# Patient Record
Sex: FEMALE | Race: OTHER | HISPANIC OR LATINO | URBAN - METROPOLITAN AREA
[De-identification: names, ages, dates, MRNs, and addresses within clinical notes are randomized per-mention and may not be internally consistent; named-entity substitution may affect disease eponyms.]

---

## 2017-01-06 ENCOUNTER — OUTPATIENT (OUTPATIENT)
Dept: OUTPATIENT SERVICES | Facility: HOSPITAL | Age: 37
LOS: 1 days | End: 2017-01-06
Payer: MEDICAID

## 2017-01-06 VITALS
WEIGHT: 130.07 LBS | RESPIRATION RATE: 18 BRPM | HEIGHT: 60 IN | OXYGEN SATURATION: 100 % | DIASTOLIC BLOOD PRESSURE: 91 MMHG | TEMPERATURE: 98 F | SYSTOLIC BLOOD PRESSURE: 144 MMHG | HEART RATE: 98 BPM

## 2017-01-06 DIAGNOSIS — M24.475 RECURRENT DISLOCATION, LEFT FOOT: ICD-10-CM

## 2017-01-06 DIAGNOSIS — Z98.890 OTHER SPECIFIED POSTPROCEDURAL STATES: Chronic | ICD-10-CM

## 2017-01-06 DIAGNOSIS — M25.572 PAIN IN LEFT ANKLE AND JOINTS OF LEFT FOOT: ICD-10-CM

## 2017-01-06 DIAGNOSIS — Z01.818 ENCOUNTER FOR OTHER PREPROCEDURAL EXAMINATION: ICD-10-CM

## 2017-01-06 DIAGNOSIS — N90.9 NONINFLAMMATORY DISORDER OF VULVA AND PERINEUM, UNSPECIFIED: Chronic | ICD-10-CM

## 2017-01-06 LAB — HCG UR QL: NEGATIVE — SIGNIFICANT CHANGE UP

## 2017-01-06 PROCEDURE — 81025 URINE PREGNANCY TEST: CPT

## 2017-01-06 PROCEDURE — G0463: CPT

## 2017-01-06 NOTE — H&P PST ADULT - FAMILY HISTORY
Father  Still living? Yes, Estimated age: Age Unknown  Family history of hypertension in father, Age at diagnosis: Age Unknown  Family history of hyperlipidemia, Age at diagnosis: Age Unknown     Grandparent  Still living? No  Family history of diabetes mellitus (DM), Age at diagnosis: Age Unknown  Family history of cervical cancer, Age at diagnosis: Age Unknown     Mother  Still living? Yes, Estimated age: Age Unknown  Family history of depression, Age at diagnosis: Age Unknown  Family history of bronchitis, Age at diagnosis: Age Unknown

## 2017-01-06 NOTE — H&P PST ADULT - HISTORY OF PRESENT ILLNESS
36year old female with pmhx bipolar disorder, anxiety presents with c/o left foot pain x years. Patient is here today for presurgical testing for talotarsal stabilization of left foot on 1/9/17

## 2017-01-06 NOTE — H&P PST ADULT - PSH
History of foot surgery  right foot talotarsal stabilization 10/2016  Vulvar mass  partial vulvectomy

## 2017-01-06 NOTE — H&P PST ADULT - NSANTHOSAYNRD_GEN_A_CORE
No. MIKE screening performed.  STOP BANG Legend: 0-2 = LOW Risk; 3-4 = INTERMEDIATE Risk; 5-8 = HIGH Risk

## 2017-01-30 ENCOUNTER — OUTPATIENT (OUTPATIENT)
Dept: OUTPATIENT SERVICES | Facility: HOSPITAL | Age: 37
LOS: 1 days | Discharge: HOME | End: 2017-01-30
Payer: MEDICAID

## 2017-01-30 VITALS
OXYGEN SATURATION: 100 % | DIASTOLIC BLOOD PRESSURE: 51 MMHG | RESPIRATION RATE: 16 BRPM | HEART RATE: 72 BPM | SYSTOLIC BLOOD PRESSURE: 102 MMHG | TEMPERATURE: 98 F

## 2017-01-30 VITALS
WEIGHT: 130.07 LBS | OXYGEN SATURATION: 99 % | HEART RATE: 85 BPM | TEMPERATURE: 98 F | DIASTOLIC BLOOD PRESSURE: 57 MMHG | SYSTOLIC BLOOD PRESSURE: 118 MMHG | HEIGHT: 60 IN | RESPIRATION RATE: 14 BRPM

## 2017-01-30 DIAGNOSIS — N90.9 NONINFLAMMATORY DISORDER OF VULVA AND PERINEUM, UNSPECIFIED: Chronic | ICD-10-CM

## 2017-01-30 DIAGNOSIS — Z98.890 OTHER SPECIFIED POSTPROCEDURAL STATES: Chronic | ICD-10-CM

## 2017-01-30 DIAGNOSIS — Z01.818 ENCOUNTER FOR OTHER PREPROCEDURAL EXAMINATION: ICD-10-CM

## 2017-01-30 DIAGNOSIS — M25.572 PAIN IN LEFT ANKLE AND JOINTS OF LEFT FOOT: ICD-10-CM

## 2017-01-30 DIAGNOSIS — M24.475 RECURRENT DISLOCATION, LEFT FOOT: ICD-10-CM

## 2017-01-30 LAB — HCG UR QL: NEGATIVE — SIGNIFICANT CHANGE UP

## 2017-01-30 PROCEDURE — 73630 X-RAY EXAM OF FOOT: CPT

## 2017-01-30 PROCEDURE — 73630 X-RAY EXAM OF FOOT: CPT | Mod: 26,LT

## 2017-01-30 PROCEDURE — 97161 PT EVAL LOW COMPLEX 20 MIN: CPT

## 2017-01-30 PROCEDURE — 28740 FUSION OF FOOT BONES: CPT

## 2017-01-30 PROCEDURE — 81025 URINE PREGNANCY TEST: CPT

## 2017-01-30 PROCEDURE — C1776: CPT

## 2017-01-30 RX ORDER — BUPROPION HYDROCHLORIDE 150 MG/1
1 TABLET, EXTENDED RELEASE ORAL
Qty: 0 | Refills: 0 | COMMUNITY

## 2017-01-30 RX ORDER — SODIUM CHLORIDE 9 MG/ML
3 INJECTION INTRAMUSCULAR; INTRAVENOUS; SUBCUTANEOUS EVERY 8 HOURS
Qty: 0 | Refills: 0 | Status: DISCONTINUED | OUTPATIENT
Start: 2017-01-30 | End: 2017-01-30

## 2017-01-30 RX ORDER — SODIUM CHLORIDE 9 MG/ML
1000 INJECTION, SOLUTION INTRAVENOUS
Qty: 0 | Refills: 0 | Status: DISCONTINUED | OUTPATIENT
Start: 2017-01-30 | End: 2017-01-30

## 2017-01-30 RX ORDER — ONDANSETRON 8 MG/1
4 TABLET, FILM COATED ORAL ONCE
Qty: 0 | Refills: 0 | Status: DISCONTINUED | OUTPATIENT
Start: 2017-01-30 | End: 2017-01-30

## 2017-01-30 RX ORDER — FENTANYL CITRATE 50 UG/ML
25 INJECTION INTRAVENOUS
Qty: 0 | Refills: 0 | Status: DISCONTINUED | OUTPATIENT
Start: 2017-01-30 | End: 2017-01-30

## 2017-01-30 RX ORDER — OXYCODONE HYDROCHLORIDE 5 MG/1
1 TABLET ORAL
Qty: 0 | Refills: 0 | COMMUNITY
Start: 2017-01-30

## 2017-01-30 RX ADMIN — SODIUM CHLORIDE 3 MILLILITER(S): 9 INJECTION INTRAMUSCULAR; INTRAVENOUS; SUBCUTANEOUS at 10:24

## 2017-01-30 NOTE — ASU DISCHARGE PLAN (ADULT/PEDIATRIC). - NOTIFY
Swelling that continues/Numbness, color, or temperature change to extremity/Numbness, tingling/Persistent Nausea and Vomiting/Pain not relieved by Medications/Bleeding that does not stop

## 2017-02-02 DIAGNOSIS — M24.475 RECURRENT DISLOCATION, LEFT FOOT: ICD-10-CM

## 2017-02-02 DIAGNOSIS — M25.572 PAIN IN LEFT ANKLE AND JOINTS OF LEFT FOOT: ICD-10-CM
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
## 2017-02-02 DIAGNOSIS — S93.332A: ICD-10-CM

## 2018-04-16 NOTE — ASU DISCHARGE PLAN (ADULT/PEDIATRIC). - MEDICATION SUMMARY - MEDICATIONS TO CHANGE
family member
I will SWITCH the dose or number of times a day I take the medications listed below when I get home from the hospital:  None

## 2018-08-07 NOTE — ASU PATIENT PROFILE, ADULT - NS SC CAGE ALCOHOL ANNOYED YOU
Cardiac Catheterization Appropriate Use    [facility] Clinical Frailty Scale     [] 1. Very Fit  [x] 2. Well  [] 3. Managing Well  [] 4. Vulnerable  [] 5. Mildly Frail  [] 6. Moderately Frail [] 7. Severely Frail  [] 8. Very Severely Frail  [] 9. Terminally III        Heart Failure  []  Yes or [x]  No  If Yes, NYHA Class I []  II []  III []  IV []    If Yes,  Newly Diagnosed? []  Yes or []  No     If Yes, Heart Failure Type? []  Diastolic  []  Systolic  [] Unknown         Electrocardiac   Assessment  Method    [] ECG   [] Telemetry Monitor [] Holter Monitor  [] Other   [] None     If any methods. Results   []Normal  [] Abnormal [] Uninterpretable         If Abnormal, New Antiarrhythmic  Therapy Initiated Prior to Cath Lab   []Yes  []No    If Abnormal,Electrocardiographic   Abnormality Type (select all that apply)      [] VFib   [] Sustained VT  [] Non-sustained VT  [] Exercised Induced VT  [] T wave Inversions    [] ST deviation >=0.5mm [] New LBBB   [] New Onset Afib  [] New Onset Aflutter  [] PVC- frequent  [] PVC- infrequent   [] 2nd Degree AV Block Type 1  [] 2nd Degree AV Block Type 2  [] 3rd Degree AV Block  [] Symptomatic Bradyarrhythmia  [] Other Electrocardiograph Abnormalities         If new onset Afib,     Heart Rate  ______bpm    If Non-sustained VT      (select all that apply) [] Symptomatic [] Newly Diagnosed  [] Other        Stress Test Peformed  [x]  Yes  []  No         If yes, specify test performed and must include risk of ischemia    StressTest Type Test Result Risk of Ischemia   [] Standard Exercise ST             (without imaging)  [x] Stress Echo  [] ST Nuclear   [] ST with CMR    [x] Positive                   []Negative  [] Indeterminate  [] Unavailable [x] High   [] Intermediate  [] Low  [] Unavailable        [] Cardiac CTA (only pick one)     Indication(s) for Cath Lab Visit  [] 3VD   [] 2VD   [] 1VD   [] No Disease       (select all that apply)   [] Indeterminant      [] ACS<=24 hours    []  ACS>24 hours  []New onset angina<=2 months  [x] Worsening Angina  [] Resuscitated Cardiac Arrest   [] Stable Known CAD  [] Suspected CAD  [] Valvular Disease  [] Pericardial Disease  [] Pre-Operative Evaluation  []  Syncope []Post-Cardiac Transplant  []Cardiac Arrhythmias   [] Cardiomyopathy  [] LV dysfunction  [] Evaluation for Exercise Clearance  [] Other         Chest Pain Symptoms     [x] Typical Angina      []   Atypical                                Angina      [] Non-anginal Chest Pain []Asymptomatic      Cardiovascular Instability  [] Yes  [x]  No     If yes, specify instability type (select all that apply)    [] Persistent Ischemic                     Symptoms(chest pain)   [] Hemodynamic Instability(not        Cardiogenic shock)  [] Cardiogenic Shock  [] Refractory Cardiogenic Shock   [] Acute Heart Failure Symptoms  [] Ventricular Arrhythmia        Evaluation for Surgery []  Cardiac Surgery        []  Non-Cardiac Surgery      Functional Capacity []<4 METS  []>= 4 METS without symptoms  [x]  >=4 mets with symptoms    []  Unknown     Surgical Risk [x]  Low     []  Intermediate    []High Risk Vascular  []  High Risk Non-Vascular     Solid Organ Transplant Surgery   []  No  []  Yes                  If yes, Donor   []  No  []  Yes                   If yes, Organ  []  Heart  []  Kidney    []  Liver  []  Lung   []  Pancreas  []  Other Organ         no

## 2019-10-23 ENCOUNTER — TRANSCRIPTION ENCOUNTER (OUTPATIENT)
Age: 39
End: 2019-10-23

## 2019-12-18 NOTE — PHYSICAL THERAPY INITIAL EVALUATION ADULT - SKIN WDL, MLM
"Past Medical History:   Diagnosis Date    Arthritis     Corns and callosities     Deep vein thrombosis     GERD (gastroesophageal reflux disease)     Gout     H/O bladder problems     uses pessary    Hyperlipidemia     Hypertension     Pulmonary embolism        Past Surgical History:   Procedure Laterality Date    APPENDECTOMY  AGE 16    BLADDER SURGERY      Twice    BREAST CYST EXCISION      Benign per patient    EYE SURGERY      LASER FOR GLAUCOMA    HAND SURGERY Left     HYSTERECTOMY      Not related to cancer per patient.    OOPHORECTOMY      TONSILLECTOMY         Review of patient's allergies indicates:   Allergen Reactions    Sulfa dyne Other (See Comments)     Blisters in mouth    Gabapentin     Hydrocodone-acetaminophen      Pt states she doesn t know how this got on there" no     Nitrofurantoin Other (See Comments)     Deathly ill , headaches, weakness, 'wiped out"    Sulfa (sulfonamide antibiotics)        No current facility-administered medications on file prior to encounter.      Current Outpatient Medications on File Prior to Encounter   Medication Sig    atorvastatin (LIPITOR) 10 MG tablet Take 1 tablet (10 mg total) by mouth once daily.    ibuprofen (ADVIL,MOTRIN) 600 MG tablet ibuprofen 600 mg tablet   Take 1 tablet 3 times a day by oral route.    sertraline (ZOLOFT) 100 MG tablet TAKE 1.5 TABLETS (150 MG TOTAL) BY MOUTH ONCE DAILY.    sumatriptan (IMITREX) 100 MG tablet 1 tab po at start of headache, may repeat in 2 hours X1 in 24 hours. (Patient taking differently: as needed. 1 tab po at start of headache, may repeat in 2 hours X1 in 24 hours.)    triamterene-hydrochlorothiazide 37.5-25 mg (MAXZIDE-25) 37.5-25 mg per tablet 1/2 p.o. q.d.    valACYclovir (VALTREX) 500 MG tablet TAKE 2 TABLETS BY MOUTH TWICE DAILY    apixaban (ELIQUIS) 2.5 mg Tab Take 1 tablet (2.5 mg total) by mouth 2 (two) times daily. (Patient not taking: Reported on 12/16/2019)    diclofenac sodium " (VOLTAREN) 1 % Gel Apply 2 g topically 4 (four) times daily.    fenofibrate 160 MG Tab Take 1 tablet (160 mg total) by mouth once daily.    ondansetron (ZOFRAN) 4 MG tablet Take 1 tablet (4 mg total) by mouth every 8 (eight) hours as needed for Nausea. (Patient not taking: Reported on 12/16/2019)     Family History     Problem Relation (Age of Onset)    Cancer Brother        Tobacco Use    Smoking status: Never Smoker    Smokeless tobacco: Never Used   Substance and Sexual Activity    Alcohol use: No    Drug use: No    Sexual activity: Never     Review of Systems   Constitutional: Negative for appetite change, chills, fatigue and fever.   HENT: Negative for congestion, hearing loss, rhinorrhea, sore throat, trouble swallowing and voice change.    Respiratory: Negative for cough, chest tightness, shortness of breath and wheezing.    Cardiovascular: Negative for chest pain, palpitations and leg swelling.   Gastrointestinal: Positive for abdominal pain. Negative for blood in stool, diarrhea, nausea and vomiting.   Genitourinary: Negative for difficulty urinating, frequency, hematuria and urgency.   Musculoskeletal: Positive for back pain. Negative for joint swelling and neck stiffness.   Skin: Negative for pallor and rash.   Neurological: Negative for tremors, seizures, syncope, speech difficulty, weakness, numbness and headaches.   Hematological: Negative for adenopathy.   Psychiatric/Behavioral: Negative for agitation, behavioral problems, confusion and sleep disturbance.     Objective:     Vital Signs (Most Recent):  Temp: 97.8 °F (36.6 °C) (12/17/19 1516)  Pulse: 74 (12/17/19 1605)  Resp: 18 (12/17/19 1605)  BP: (!) 111/56 (12/17/19 1516)  SpO2: 97 % (12/17/19 1605) Vital Signs (24h Range):  Temp:  [97.7 °F (36.5 °C)-98.8 °F (37.1 °C)] 97.8 °F (36.6 °C)  Pulse:  [61-98] 74  Resp:  [12-18] 18  SpO2:  [90 %-100 %] 97 %  BP: (105-147)/(56-74) 111/56     Weight: 72.6 kg (160 lb)  Body mass index is 26.63  kg/m².    Physical Exam   Constitutional: She is oriented to person, place, and time. She appears well-developed and well-nourished. No distress.   HENT:   Head: Normocephalic and atraumatic.   Right Ear: External ear normal.   Left Ear: External ear normal.   Nose: Nose normal.   Mouth/Throat: Oropharynx is clear and moist.   Eyes: Pupils are equal, round, and reactive to light. Conjunctivae and EOM are normal. Right eye exhibits no discharge. Left eye exhibits no discharge. No scleral icterus.   Neck: Normal range of motion. Neck supple. No thyromegaly present.   Cardiovascular: Normal rate, regular rhythm, normal heart sounds and intact distal pulses.   No murmur heard.  Pulmonary/Chest: Effort normal and breath sounds normal. No stridor. No respiratory distress. She has no wheezes. She has no rales.   Abdominal: Soft. Bowel sounds are normal. She exhibits no distension. There is tenderness. There is guarding.   Musculoskeletal: She exhibits no edema or tenderness.   Lymphadenopathy:     She has no cervical adenopathy.   Neurological: She is alert and oriented to person, place, and time. No cranial nerve deficit or sensory deficit. She exhibits normal muscle tone. Coordination normal.   Skin: Skin is warm and dry. Capillary refill takes less than 2 seconds. No rash noted. She is not diaphoretic. No erythema.   Psychiatric: She has a normal mood and affect. Her behavior is normal. Judgment and thought content normal.   Nursing note and vitals reviewed.        CRANIAL NERVES     CN III, IV, VI   Pupils are equal, round, and reactive to light.  Extraocular motions are normal.        Significant Labs: BMP: No results for input(s): GLU, NA, K, CL, CO2, BUN, CREATININE, CALCIUM, MG in the last 48 hours.  CBC: No results for input(s): WBC, HGB, HCT, PLT in the last 48 hours.    Significant Imaging: I have reviewed all pertinent imaging results/findings within the past 24 hours.   WDL except

## 2020-06-15 ENCOUNTER — TRANSCRIPTION ENCOUNTER (OUTPATIENT)
Age: 40
End: 2020-06-15

## 2020-08-06 DIAGNOSIS — Z01.818 ENCOUNTER FOR OTHER PREPROCEDURAL EXAMINATION: ICD-10-CM

## 2020-08-06 PROBLEM — Z00.00 ENCOUNTER FOR PREVENTIVE HEALTH EXAMINATION: Status: ACTIVE | Noted: 2020-08-06

## 2020-08-07 ENCOUNTER — APPOINTMENT (OUTPATIENT)
Dept: DISASTER EMERGENCY | Facility: CLINIC | Age: 40
End: 2020-08-07

## 2020-08-07 RX ORDER — SODIUM CHLORIDE 9 MG/ML
3 INJECTION INTRAMUSCULAR; INTRAVENOUS; SUBCUTANEOUS EVERY 8 HOURS
Refills: 0 | Status: DISCONTINUED | OUTPATIENT
Start: 2020-08-10 | End: 2020-08-10

## 2020-08-08 LAB — SARS-COV-2 N GENE NPH QL NAA+PROBE: NOT DETECTED

## 2020-08-09 ENCOUNTER — TRANSCRIPTION ENCOUNTER (OUTPATIENT)
Age: 40
End: 2020-08-09

## 2020-08-09 VITALS
OXYGEN SATURATION: 100 % | HEART RATE: 80 BPM | RESPIRATION RATE: 16 BRPM | SYSTOLIC BLOOD PRESSURE: 112 MMHG | HEIGHT: 60 IN | TEMPERATURE: 99 F | WEIGHT: 130.95 LBS | DIASTOLIC BLOOD PRESSURE: 66 MMHG

## 2020-08-09 NOTE — H&P PST ADULT - RS GEN PE MLT RESP DETAILS PC
breath sounds equal/clear to auscultation bilaterally/airway patent/normal/respirations non-labored/good air movement

## 2020-08-09 NOTE — H&P PST ADULT - REASON FOR ADMISSION
"bunion correction for more then 5 years" "I'm getting a bunion correction that I had for more then 5 years"

## 2020-08-09 NOTE — H&P PST ADULT - NEGATIVE MUSCULOSKELETAL SYMPTOMS
no arthralgia/no arthritis/no muscle cramps/no joint swelling/no muscle weakness/no myalgia/no stiffness

## 2020-08-09 NOTE — H&P PST ADULT - NSICDXPASTSURGICALHX_GEN_ALL_CORE_FT
PAST SURGICAL HISTORY:  History of foot surgery right foot talotarsal stabilization 10/2016    Vulvar mass partial vulvectomy

## 2020-08-09 NOTE — H&P PST ADULT - NEGATIVE NEUROLOGICAL SYMPTOMS
no syncope/no difficulty walking/no focal seizures/no paresthesias/no transient paralysis/no weakness/no generalized seizures

## 2020-08-09 NOTE — H&P PST ADULT - NEGATIVE CARDIOVASCULAR SYMPTOMS
no chest pain/H/o HLD/no orthopnea/no paroxysmal nocturnal dyspnea/no palpitations/no dyspnea on exertion

## 2020-08-09 NOTE — H&P PST ADULT - ASSESSMENT
40 years old female PMHx of HLD, depression, anxiety, bipolar disorder and PShx gate correction- talotarsal stabilization- bilateral feet is being prepared for right foot crossroads mini bunion with hardware fixation on 8/10/2020

## 2020-08-09 NOTE — H&P PST ADULT - NSICDXFAMILYHX_GEN_ALL_CORE_FT
FAMILY HISTORY:  Father  Still living? Yes, Estimated age: Age Unknown  Family history of hyperlipidemia, Age at diagnosis: Age Unknown  Family history of hypertension in father, Age at diagnosis: Age Unknown    Mother  Still living? Yes, Estimated age: Age Unknown  Family history of bronchitis, Age at diagnosis: Age Unknown  Family history of depression, Age at diagnosis: Age Unknown    Grandparent  Still living? No  Family history of cervical cancer, Age at diagnosis: Age Unknown  Family history of diabetes mellitus (DM), Age at diagnosis: Age Unknown

## 2020-08-09 NOTE — H&P PST ADULT - NEUROLOGICAL DETAILS
responds to verbal commands/sensation intact/deep reflexes intact/responds to pain/cranial nerves intact/alert and oriented x 3

## 2020-08-09 NOTE — H&P PST ADULT - NEGATIVE GASTROINTESTINAL SYMPTOMS
no vomiting/no diarrhea/no change in bowel habits/no flatulence/no abdominal pain/no constipation/no nausea

## 2020-08-09 NOTE — H&P PST ADULT - HISTORY OF PRESENT ILLNESS
40 years old female no PMHX gate correction bilateral feet Due to COVID guidelines, the history is taken from patient over the phone abd physical on the day of surgery   40 years old female PMHx of HLD, depression, anxiety, bipolar disorder and PShx gate correction- talotarsal stabilization- bilateral feet is being prepared for right foot crossroads mini bunion with hardware fixation on 8/10/2020

## 2020-08-09 NOTE — H&P PST ADULT - MUSCULOSKELETAL
details… detailed exam ROM intact/no joint swelling/no joint erythema/normal strength/no joint warmth/no calf tenderness/normal

## 2020-08-09 NOTE — H&P PST ADULT - NSICDXPROBLEM_GEN_ALL_CORE_FT
PROBLEM DIAGNOSES  Problem: Other acquired deformities of foot  Assessment and Plan: patient is scheduled for right foot crossroads mini bunion with hardware fixation on 8/10/2020      Problem: Hallux valgus (acquired), right foot  Assessment and Plan: patient is scheduled for right foot crossroads mini bunion with hardware fixation on 8/10/2020

## 2020-08-10 ENCOUNTER — OUTPATIENT (OUTPATIENT)
Dept: OUTPATIENT SERVICES | Facility: HOSPITAL | Age: 40
LOS: 1 days | End: 2020-08-10
Payer: MEDICAID

## 2020-08-10 VITALS
DIASTOLIC BLOOD PRESSURE: 83 MMHG | SYSTOLIC BLOOD PRESSURE: 128 MMHG | OXYGEN SATURATION: 99 % | HEART RATE: 67 BPM | RESPIRATION RATE: 13 BRPM

## 2020-08-10 DIAGNOSIS — N90.9 NONINFLAMMATORY DISORDER OF VULVA AND PERINEUM, UNSPECIFIED: Chronic | ICD-10-CM

## 2020-08-10 DIAGNOSIS — M20.11 HALLUX VALGUS (ACQUIRED), RIGHT FOOT: ICD-10-CM

## 2020-08-10 DIAGNOSIS — M21.6X9 OTHER ACQUIRED DEFORMITIES OF UNSPECIFIED FOOT: ICD-10-CM

## 2020-08-10 DIAGNOSIS — Z98.890 OTHER SPECIFIED POSTPROCEDURAL STATES: Chronic | ICD-10-CM

## 2020-08-10 LAB
ANION GAP SERPL CALC-SCNC: 4 MMOL/L — LOW (ref 5–17)
BUN SERPL-MCNC: 10 MG/DL — SIGNIFICANT CHANGE UP (ref 7–18)
CALCIUM SERPL-MCNC: 9.3 MG/DL — SIGNIFICANT CHANGE UP (ref 8.4–10.5)
CHLORIDE SERPL-SCNC: 106 MMOL/L — SIGNIFICANT CHANGE UP (ref 96–108)
CO2 SERPL-SCNC: 30 MMOL/L — SIGNIFICANT CHANGE UP (ref 22–31)
CREAT SERPL-MCNC: 0.7 MG/DL — SIGNIFICANT CHANGE UP (ref 0.5–1.3)
GLUCOSE SERPL-MCNC: 80 MG/DL — SIGNIFICANT CHANGE UP (ref 70–99)
POTASSIUM SERPL-MCNC: 4.5 MMOL/L — SIGNIFICANT CHANGE UP (ref 3.5–5.3)
POTASSIUM SERPL-SCNC: 4.5 MMOL/L — SIGNIFICANT CHANGE UP (ref 3.5–5.3)
SODIUM SERPL-SCNC: 140 MMOL/L — SIGNIFICANT CHANGE UP (ref 135–145)

## 2020-08-10 PROCEDURE — C1713: CPT

## 2020-08-10 PROCEDURE — 80048 BASIC METABOLIC PNL TOTAL CA: CPT

## 2020-08-10 PROCEDURE — C1889: CPT

## 2020-08-10 PROCEDURE — 36415 COLL VENOUS BLD VENIPUNCTURE: CPT

## 2020-08-10 PROCEDURE — 73630 X-RAY EXAM OF FOOT: CPT | Mod: 26,RT

## 2020-08-10 PROCEDURE — 28295 COR HLX VLGS PRX MTAR OSTEOT: CPT | Mod: RT

## 2020-08-10 PROCEDURE — 73630 X-RAY EXAM OF FOOT: CPT | Mod: 26,RT,77

## 2020-08-10 PROCEDURE — 73630 X-RAY EXAM OF FOOT: CPT

## 2020-08-10 PROCEDURE — C1776: CPT

## 2020-08-10 RX ORDER — LAMOTRIGINE 25 MG/1
125 TABLET, ORALLY DISINTEGRATING ORAL
Qty: 0 | Refills: 0 | DISCHARGE

## 2020-08-10 RX ORDER — DEXTROAMPHETAMINE SACCHARATE, AMPHETAMINE ASPARTATE, DEXTROAMPHETAMINE SULFATE AND AMPHETAMINE SULFATE 1.875; 1.875; 1.875; 1.875 MG/1; MG/1; MG/1; MG/1
1 TABLET ORAL
Qty: 0 | Refills: 0 | DISCHARGE

## 2020-08-10 NOTE — ASU DISCHARGE PLAN (ADULT/PEDIATRIC) - CARE PROVIDER_API CALL
Regan Hale  PODIATRIC MEDICINE AND SURGERY  [address] [place] [id number]  Phone: [phone]  Fax: [phone]  Follow Up Time: 1 week

## 2020-08-10 NOTE — ASU DISCHARGE PLAN (ADULT/PEDIATRIC) - CALL YOUR DOCTOR IF YOU HAVE ANY OF THE FOLLOWING:
Numbness, tingling, color or temperature change to extremity/Bleeding that does not stop/Swelling that gets worse/Fever greater than (need to indicate Fahrenheit or Celsius)/Increased irritability or sluggishness/Pain not relieved by Medications/Nausea and vomiting that does not stop/Inability to tolerate liquids or foods/Excessive diarrhea/Wound/Surgical Site with redness, or foul smelling discharge or pus/Unable to urinate

## 2020-10-15 PROBLEM — E78.00 PURE HYPERCHOLESTEROLEMIA, UNSPECIFIED: Chronic | Status: ACTIVE | Noted: 2020-08-10

## 2020-11-09 ENCOUNTER — TRANSCRIPTION ENCOUNTER (OUTPATIENT)
Age: 40
End: 2020-11-09

## 2020-11-09 ENCOUNTER — APPOINTMENT (OUTPATIENT)
Dept: GASTROENTEROLOGY | Facility: CLINIC | Age: 40
End: 2020-11-09
Payer: MEDICAID

## 2020-11-09 VITALS
HEART RATE: 85 BPM | HEIGHT: 60 IN | BODY MASS INDEX: 26.31 KG/M2 | TEMPERATURE: 98.4 F | DIASTOLIC BLOOD PRESSURE: 79 MMHG | SYSTOLIC BLOOD PRESSURE: 122 MMHG | OXYGEN SATURATION: 97 % | WEIGHT: 134 LBS

## 2020-11-09 DIAGNOSIS — K59.00 CONSTIPATION, UNSPECIFIED: ICD-10-CM

## 2020-11-09 DIAGNOSIS — K21.9 GASTRO-ESOPHAGEAL REFLUX DISEASE W/OUT ESOPHAGITIS: ICD-10-CM

## 2020-11-09 DIAGNOSIS — R10.32 RIGHT LOWER QUADRANT PAIN: ICD-10-CM

## 2020-11-09 DIAGNOSIS — R10.31 RIGHT LOWER QUADRANT PAIN: ICD-10-CM

## 2020-11-09 DIAGNOSIS — R10.13 EPIGASTRIC PAIN: ICD-10-CM

## 2020-11-09 DIAGNOSIS — Z86.39 PERSONAL HISTORY OF OTHER ENDOCRINE, NUTRITIONAL AND METABOLIC DISEASE: ICD-10-CM

## 2020-11-09 DIAGNOSIS — Z78.9 OTHER SPECIFIED HEALTH STATUS: ICD-10-CM

## 2020-11-09 DIAGNOSIS — Z87.891 PERSONAL HISTORY OF NICOTINE DEPENDENCE: ICD-10-CM

## 2020-11-09 PROCEDURE — 99203 OFFICE O/P NEW LOW 30 MIN: CPT

## 2020-11-09 PROCEDURE — 99072 ADDL SUPL MATRL&STAF TM PHE: CPT

## 2020-11-09 RX ORDER — ATORVASTATIN CALCIUM 40 MG/1
40 TABLET, FILM COATED ORAL
Qty: 30 | Refills: 0 | Status: ACTIVE | COMMUNITY
Start: 2020-05-22

## 2020-11-09 RX ORDER — METRONIDAZOLE 7.5 MG/G
0.75 CREAM TOPICAL
Qty: 45 | Refills: 0 | Status: ACTIVE | COMMUNITY
Start: 2020-11-05

## 2020-11-09 RX ORDER — DOXYCYCLINE HYCLATE 20 MG/1
20 TABLET ORAL
Refills: 0 | Status: ACTIVE | COMMUNITY

## 2020-11-09 RX ORDER — FLUOCINOLONE ACETONIDE 0.11 MG/ML
0.01 OIL AURICULAR (OTIC)
Qty: 20 | Refills: 0 | Status: ACTIVE | COMMUNITY
Start: 2020-05-29

## 2020-11-09 RX ORDER — ACETAMINOPHEN AND CODEINE 300; 30 MG/1; MG/1
300-30 TABLET ORAL
Qty: 15 | Refills: 0 | Status: ACTIVE | COMMUNITY
Start: 2020-08-16

## 2020-11-09 RX ORDER — CLINDAMYCIN HYDROCHLORIDE 150 MG/1
150 CAPSULE ORAL
Qty: 14 | Refills: 0 | Status: ACTIVE | COMMUNITY
Start: 2020-09-08

## 2020-11-09 RX ORDER — LUBIPROSTONE 24 UG/1
24 CAPSULE, GELATIN COATED ORAL TWICE DAILY
Qty: 60 | Refills: 3 | Status: ACTIVE | COMMUNITY
Start: 2020-11-09 | End: 1900-01-01

## 2020-11-09 RX ORDER — CHOLECALCIFEROL (VITAMIN D3) 1250 MCG
1.25 MG CAPSULE ORAL
Qty: 4 | Refills: 0 | Status: ACTIVE | COMMUNITY
Start: 2020-09-08

## 2020-11-09 RX ORDER — DOXYCYCLINE HYCLATE 50 MG/1
50 CAPSULE ORAL
Qty: 30 | Refills: 0 | Status: ACTIVE | COMMUNITY
Start: 2020-10-23

## 2020-11-09 RX ORDER — OXYCODONE AND ACETAMINOPHEN 5; 325 MG/1; MG/1
5-325 TABLET ORAL
Qty: 15 | Refills: 0 | Status: ACTIVE | COMMUNITY
Start: 2020-08-05

## 2020-11-09 RX ORDER — MELOXICAM 15 MG/1
15 TABLET ORAL
Qty: 21 | Refills: 0 | Status: ACTIVE | COMMUNITY
Start: 2020-09-08

## 2020-11-09 RX ORDER — MAGNESIUM OXIDE/MAG AA CHELATE 300 MG
CAPSULE ORAL
Refills: 0 | Status: ACTIVE | COMMUNITY

## 2020-11-09 RX ORDER — ATORVASTATIN CALCIUM 40 MG/1
40 TABLET, FILM COATED ORAL
Refills: 0 | Status: ACTIVE | COMMUNITY

## 2020-11-09 RX ORDER — ACETAMINOPHEN 325 MG
TABLET ORAL
Refills: 0 | Status: ACTIVE | COMMUNITY

## 2020-11-09 RX ORDER — ACETIC ACID 20 MG/ML
2 SOLUTION AURICULAR (OTIC)
Qty: 15 | Refills: 0 | Status: ACTIVE | COMMUNITY
Start: 2020-05-11

## 2020-11-09 RX ORDER — ALUMINUM CHLORIDE 20 %
20 SOLUTION, NON-ORAL TOPICAL
Qty: 35 | Refills: 0 | Status: ACTIVE | COMMUNITY
Start: 2020-05-22

## 2020-11-09 NOTE — ASSESSMENT
[FreeTextEntry1] : Abdominal Pain: The patient complains of abdominal pain. The patient is to avoid nonsteroidal anti-inflammatory drugs and aspirin.  I recommend a trial of Phazyme 1 tablet PO 3 times a day for 3 months for the symptoms.\par Dyspepsia: The patient complains of dyspeptic symptoms.  The patient was advised to abide by an anti-gas (low FOD-MAP) diet.  The patient was given a pamphlet for anti-gas.  The patient and I reviewed the anti-gas diet at length. The patient is to start on a trial of Phazyme one tablet 3 times a day p.r.n. abdominal pain and gas.\par Constipation: The patient complains of constipation. I recommend a high-fiber diet. I recommend a trial of a probiotic such as Align once a day. I recommend a trial of Metamucil once a day for fiber supplementation. I recommend a trial of Amitiza 24 mcg twice a day for the constipation. If the symptoms persist, the patient may require a colonoscopy to assess the symptoms.  The patient was told of the risks and benefits of the procedure.  The patient was told of the risks of perforation, emergency surgery, bleeding, infections and missed lesions.  The patient agreed and will followup to reassess the symptoms.  \par If the symptoms persist, the patient may require a colonoscopy to assess the colon.  The patient was told of the risks and benefits of the procedure.  The patient was told of the risks of perforation, emergency surgery, bleeding, infections and missed lesions.  The patient agreed and will follow-up to reassess the symptoms.  \par Follow-up: The patient is to follow-up in the office in 2 months to reassess the symptoms. The patient was told to call the office if any further problems. \par \par

## 2020-11-09 NOTE — HISTORY OF PRESENT ILLNESS
[None] : had no significant interval events [Nausea] : denies nausea [Vomiting] : denies vomiting [Diarrhea] : denies diarrhea [Yellow Skin Or Eyes (Jaundice)] : denies jaundice [Rectal Pain] : denies rectal pain [Heartburn] : heartburn [Constipation] : constipation [Abdominal Pain] : abdominal pain [Abdominal Swelling] : abdominal swelling [GERD] : gastroesophageal reflux disease [Wt Gain ___ Lbs] : no recent weight gain [Wt Loss ___ Lbs] : no recent weight loss [Hiatus Hernia] : no hiatus hernia [Peptic Ulcer Disease] : no peptic ulcer disease [Pancreatitis] : no pancreatitis [Cholelithiasis] : no cholelithiasis [Kidney Stone] : no kidney stone [Inflammatory Bowel Disease] : no inflammatory bowel disease [Irritable Bowel Syndrome] : no irritable bowel syndrome [Diverticulitis] : no diverticulitis [Alcohol Abuse] : no alcohol abuse [Malignancy] : no malignancy [Abdominal Surgery] : no abdominal surgery [Appendectomy] : no appendectomy [Cholecystectomy] : no cholecystectomy [de-identified] : The patient is a 40-year-old  female with past medical history significant for hypercholesterolemia who was referred to my office by Dr. Dalton Villanueva for abdominal pain, constipation, change in bowel habits and change in caliber of stool. The patient also admits to having dyspepsia and gastroesophageal reflux disease. I was asked to render an opinion for consultation for the above complaints.   The patient states that she is feeling uncomfortable x 1 year.  The patient complains of abdominal pain.  The patient describes the abdominal pain as a occasional crampy, left lower quadrant discomfort that is nonradiating in nature.  The abdominal pain is worse with meals and improves with passing gas or having a bowel movement.  The abdominal pain is described as being mild to moderate in nature.  The abdominal pain occurs at night and in the morning.  The abdominal pain can occur at any time.   The abdominal pain has awakened the patient from sleep.  The abdominal pain is not relieved with any medications.  The abdominal pain is associated with abdominal gas and bloating.  The patient denies any nausea or vomiting.  The patient complains of occasional gastroesophageal reflux disease but denies any dysphagia. The gastroesophageal reflux disease is worse after meals and late at night. The gastroesophageal reflux disease is improved with proton pump inhibitors, H2 blockers and antacids.  The patient denies any atypical chest pain, shortness of breath or palpitations.  The patient denies any diaphoresis. The patient complains of constipation but denies any diarrhea.  The patient has 1 bowel movement every 3 to 4 days.  The patient complains of a change in bowel habits.  The patient complains of a change in caliber of stool.   The patient denies having mucus discharge with the bowel movements.  The patient denies any bright red blood per rectum, melena or hematemesis.  The patient denies any rectal pain or rectal pruritus. The patient complains of fluctuating weight but denies any anorexia.  She denies any fevers or chills.  The patient denies any jaundice or pruritus.  The patient complains of occasional lower back pain.  The patient denies ever having a prior upper endoscopy and colonoscopy performed by another gastroenterologist.  The patient's last menstrual period was on 2020. The patient's periods are regular at 28 to 30 days.  The patient's menstrual periods are light to regular for 3 days.  The patient is a .  The patient's first menstrual period was at age 13. The patient admits to a family history of GI problems.  The patient’s mother had a history of irritable bowel syndrome. [de-identified] : (+) prior smoking 1 PPD x 17 years stopped x 4 years, (-) ETOH, (-) IVDA\par

## 2020-11-09 NOTE — REVIEW OF SYSTEMS
[Feeling Tired] : feeling tired [Eyesight Problems] : eyesight problems [Abdominal Pain] : abdominal pain [Constipation] : constipation [Heartburn] : heartburn [Arthralgias] : arthralgias [Anxiety] : anxiety [Depression] : depression [Negative] : Heme/Lymph [FreeTextEntry4] : dry ears [de-identified] : headaches

## 2020-11-10 ENCOUNTER — TRANSCRIPTION ENCOUNTER (OUTPATIENT)
Age: 40
End: 2020-11-10

## 2020-11-10 LAB — HEMOCCULT STL QL: NEGATIVE

## 2021-01-11 ENCOUNTER — APPOINTMENT (OUTPATIENT)
Dept: GASTROENTEROLOGY | Facility: CLINIC | Age: 41
End: 2021-01-11

## 2021-06-08 NOTE — PHYSICAL THERAPY INITIAL EVALUATION ADULT - LIVES WITH, PROFILE
"I fell down the stairs 4 days ago. I have chest tenderness & left knee discomfort"
alone/MOM WILL BE WITH PATIENT. 5 STEPS TO GET INTO ,BILATERAL WIDE RAILING

## 2021-07-26 ENCOUNTER — TRANSCRIPTION ENCOUNTER (OUTPATIENT)
Age: 41
End: 2021-07-26

## 2021-07-26 ENCOUNTER — OUTPATIENT (OUTPATIENT)
Dept: OUTPATIENT SERVICES | Facility: HOSPITAL | Age: 41
LOS: 1 days | End: 2021-07-26
Payer: MEDICAID

## 2021-07-26 VITALS
DIASTOLIC BLOOD PRESSURE: 79 MMHG | SYSTOLIC BLOOD PRESSURE: 129 MMHG | WEIGHT: 134.92 LBS | HEIGHT: 60 IN | HEART RATE: 67 BPM | OXYGEN SATURATION: 100 % | TEMPERATURE: 98 F | RESPIRATION RATE: 18 BRPM

## 2021-07-26 DIAGNOSIS — N90.9 NONINFLAMMATORY DISORDER OF VULVA AND PERINEUM, UNSPECIFIED: Chronic | ICD-10-CM

## 2021-07-26 DIAGNOSIS — Z98.890 OTHER SPECIFIED POSTPROCEDURAL STATES: Chronic | ICD-10-CM

## 2021-07-26 DIAGNOSIS — T85.698D OTHER MECHANICAL COMPLICATION OF OTHER SPECIFIED INTERNAL PROSTHETIC DEVICES, IMPLANTS AND GRAFTS, SUBSEQUENT ENCOUNTER: ICD-10-CM

## 2021-07-26 DIAGNOSIS — E78.5 HYPERLIPIDEMIA, UNSPECIFIED: ICD-10-CM

## 2021-07-26 DIAGNOSIS — M65.871 OTHER SYNOVITIS AND TENOSYNOVITIS, RIGHT ANKLE AND FOOT: ICD-10-CM

## 2021-07-26 DIAGNOSIS — E03.9 HYPOTHYROIDISM, UNSPECIFIED: ICD-10-CM

## 2021-07-26 DIAGNOSIS — Z01.818 ENCOUNTER FOR OTHER PREPROCEDURAL EXAMINATION: ICD-10-CM

## 2021-07-26 DIAGNOSIS — F41.9 ANXIETY DISORDER, UNSPECIFIED: ICD-10-CM

## 2021-07-26 LAB
ANION GAP SERPL CALC-SCNC: 6 MMOL/L — SIGNIFICANT CHANGE UP (ref 5–17)
BUN SERPL-MCNC: 18 MG/DL — SIGNIFICANT CHANGE UP (ref 7–18)
CALCIUM SERPL-MCNC: 9 MG/DL — SIGNIFICANT CHANGE UP (ref 8.4–10.5)
CHLORIDE SERPL-SCNC: 101 MMOL/L — SIGNIFICANT CHANGE UP (ref 96–108)
CO2 SERPL-SCNC: 27 MMOL/L — SIGNIFICANT CHANGE UP (ref 22–31)
CREAT SERPL-MCNC: 0.65 MG/DL — SIGNIFICANT CHANGE UP (ref 0.5–1.3)
GLUCOSE SERPL-MCNC: 69 MG/DL — LOW (ref 70–99)
POTASSIUM SERPL-MCNC: 4.5 MMOL/L — SIGNIFICANT CHANGE UP (ref 3.5–5.3)
POTASSIUM SERPL-SCNC: 4.5 MMOL/L — SIGNIFICANT CHANGE UP (ref 3.5–5.3)
SODIUM SERPL-SCNC: 134 MMOL/L — LOW (ref 135–145)

## 2021-07-26 PROCEDURE — G0463: CPT

## 2021-07-26 PROCEDURE — 80048 BASIC METABOLIC PNL TOTAL CA: CPT

## 2021-07-26 PROCEDURE — 36415 COLL VENOUS BLD VENIPUNCTURE: CPT

## 2021-07-26 RX ORDER — ATORVASTATIN CALCIUM 80 MG/1
1 TABLET, FILM COATED ORAL
Qty: 0 | Refills: 0 | DISCHARGE

## 2021-07-26 NOTE — H&P PST ADULT - NSICDXPROBLEM_GEN_ALL_CORE_FT
PROBLEM DIAGNOSES  Problem: Other synovitis and tenosynovitis, right ankle and foot  Assessment and Plan: Removal of right foot hardware and fluoroscopy guided injection on 7/27/2021. Preoperative instructions discussed with pt and given to pt. Instructed pt that she will need someone to escort cystoscopy, left ureteroscopy, laser lithotripsy, insertion of left double-J stent and removal of nephrostomy tube home after surgery, to notify security when she arrives in the lobby of the hospital that she is here for surgery, not to eat or drink anything after midnight the night before the surgery, to avoid NSAIDS such as Ibuprofen, motrin, aleve, advil, naproxen before surgery, to take Tylenol if needed for pain, to report if she has been exposed to any one with any contagious diseases including Covid-19 or if she is exhibiting any symptoms of COVID-19. Instructed about use of Chlorhexidine 4% soap before surgery. Verbalized understanding of instructions given.     Problem: Hypothyroidism  Assessment and Plan: Instructed to continue Levothyroxine and take with sips of water on day of surgery. Follow-up with provider for management.     Problem: HLD (hyperlipidemia)  Assessment and Plan: Follow-up with PCP for management.     Problem: Anxiety and depression  Assessment and Plan: Follow-up with provider for management and any signs of psychosis.

## 2021-07-26 NOTE — H&P PST ADULT - HISTORY OF PRESENT ILLNESS
This is a 41 yr old female with PMH of hypothyroidism, Hyperlipidemia-diet controlled, PSH of right foot bunionectomy presents with c/o right foot pain     COVID-19 Pfizer vaccine 1st dose on 04/03/2021 and 2nd dose on 05/01/2021. This is a 41 yr old female with PMH of hypothyroidism, Hyperlipidemia-diet controlled, anxiety and depression-stable-no meds, PSH of right foot bunionectomy presents with c/o right foot pain. Pt is scheduled for removal of right foot hardware and fluoroscopy guided injection on 07/27/2021.     COVID-19 Pfizer vaccine 1st dose on 04/03/2021 and 2nd dose on 05/01/2021.

## 2021-07-26 NOTE — H&P PST ADULT - NEGATIVE NEUROLOGICAL SYMPTOMS
no weakness/no paresthesias/no generalized seizures/no focal seizures/no syncope/no tremors/no vertigo/no loss of sensation/no headache

## 2021-07-26 NOTE — H&P PST ADULT - ASSESSMENT
This is a 41 yr old female with PMH of hypothyroidism, Hyperlipidemia-diet controlled, anxiety and depression-stable-no meds, PSH of right foot bunionectomy presents with right ankle and foot synovitis and tenosynovitis; mechanical complication of internal prosthetic device. Pt is scheduled for removal of right foot hardware and fluoroscopy guided injection on 07/27/2021.

## 2021-07-26 NOTE — H&P PST ADULT - NSICDXPASTMEDICALHX_GEN_ALL_CORE_FT
PAST MEDICAL HISTORY:  Anxiety     Bipolar 1 disorder     Bunion, right foot     Depression     Hypercholesteremia     Hypothyroidism

## 2021-07-26 NOTE — H&P PST ADULT - RS GEN PE MLT RESP DETAILS PC
normal/airway patent/breath sounds equal/good air movement/respirations non-labored/clear to auscultation bilaterally/no chest wall tenderness normal/airway patent/breath sounds equal/good air movement/respirations non-labored/clear to auscultation bilaterally/no chest wall tenderness/no intercostal retractions/no rales/no rhonchi/no subcutaneous emphysema/no wheezes

## 2021-07-27 ENCOUNTER — RESULT REVIEW (OUTPATIENT)
Age: 41
End: 2021-07-27

## 2021-07-27 ENCOUNTER — OUTPATIENT (OUTPATIENT)
Dept: OUTPATIENT SERVICES | Facility: HOSPITAL | Age: 41
LOS: 1 days | End: 2021-07-27
Payer: MEDICAID

## 2021-07-27 VITALS
OXYGEN SATURATION: 100 % | SYSTOLIC BLOOD PRESSURE: 112 MMHG | TEMPERATURE: 98 F | HEIGHT: 60 IN | WEIGHT: 134.92 LBS | RESPIRATION RATE: 16 BRPM | DIASTOLIC BLOOD PRESSURE: 75 MMHG | HEART RATE: 74 BPM

## 2021-07-27 VITALS
HEART RATE: 75 BPM | RESPIRATION RATE: 16 BRPM | SYSTOLIC BLOOD PRESSURE: 107 MMHG | TEMPERATURE: 98 F | OXYGEN SATURATION: 99 % | DIASTOLIC BLOOD PRESSURE: 73 MMHG

## 2021-07-27 DIAGNOSIS — Z98.890 OTHER SPECIFIED POSTPROCEDURAL STATES: Chronic | ICD-10-CM

## 2021-07-27 DIAGNOSIS — M65.871 OTHER SYNOVITIS AND TENOSYNOVITIS, RIGHT ANKLE AND FOOT: ICD-10-CM

## 2021-07-27 DIAGNOSIS — N90.9 NONINFLAMMATORY DISORDER OF VULVA AND PERINEUM, UNSPECIFIED: Chronic | ICD-10-CM

## 2021-07-27 DIAGNOSIS — T85.698D OTHER MECHANICAL COMPLICATION OF OTHER SPECIFIED INTERNAL PROSTHETIC DEVICES, IMPLANTS AND GRAFTS, SUBSEQUENT ENCOUNTER: ICD-10-CM

## 2021-07-27 LAB
ANION GAP SERPL CALC-SCNC: 3 MMOL/L — LOW (ref 5–17)
BUN SERPL-MCNC: 13 MG/DL — SIGNIFICANT CHANGE UP (ref 7–18)
CALCIUM SERPL-MCNC: 8.8 MG/DL — SIGNIFICANT CHANGE UP (ref 8.4–10.5)
CHLORIDE SERPL-SCNC: 107 MMOL/L — SIGNIFICANT CHANGE UP (ref 96–108)
CO2 SERPL-SCNC: 29 MMOL/L — SIGNIFICANT CHANGE UP (ref 22–31)
CREAT SERPL-MCNC: 0.63 MG/DL — SIGNIFICANT CHANGE UP (ref 0.5–1.3)
GLUCOSE SERPL-MCNC: 76 MG/DL — SIGNIFICANT CHANGE UP (ref 70–99)
POTASSIUM SERPL-MCNC: 4.4 MMOL/L — SIGNIFICANT CHANGE UP (ref 3.5–5.3)
POTASSIUM SERPL-SCNC: 4.4 MMOL/L — SIGNIFICANT CHANGE UP (ref 3.5–5.3)
SODIUM SERPL-SCNC: 139 MMOL/L — SIGNIFICANT CHANGE UP (ref 135–145)

## 2021-07-27 PROCEDURE — C1713: CPT

## 2021-07-27 PROCEDURE — 36415 COLL VENOUS BLD VENIPUNCTURE: CPT

## 2021-07-27 PROCEDURE — 80048 BASIC METABOLIC PNL TOTAL CA: CPT

## 2021-07-27 PROCEDURE — 73630 X-RAY EXAM OF FOOT: CPT | Mod: 26,RT

## 2021-07-27 PROCEDURE — 20680 REMOVAL OF IMPLANT DEEP: CPT

## 2021-07-27 PROCEDURE — 88300 SURGICAL PATH GROSS: CPT

## 2021-07-27 PROCEDURE — 73630 X-RAY EXAM OF FOOT: CPT

## 2021-07-27 PROCEDURE — 88300 SURGICAL PATH GROSS: CPT | Mod: 26

## 2021-07-27 RX ORDER — ACETAMINOPHEN 500 MG
2 TABLET ORAL
Qty: 0 | Refills: 0 | DISCHARGE

## 2021-07-27 RX ORDER — LEVOTHYROXINE SODIUM 125 MCG
1 TABLET ORAL
Qty: 0 | Refills: 0 | DISCHARGE

## 2021-07-27 RX ORDER — MELOXICAM 15 MG/1
1 TABLET ORAL
Qty: 0 | Refills: 0 | DISCHARGE

## 2021-07-27 RX ORDER — LANOLIN ALCOHOL/MO/W.PET/CERES
1 CREAM (GRAM) TOPICAL
Qty: 0 | Refills: 0 | DISCHARGE

## 2021-07-27 RX ORDER — SODIUM CHLORIDE 9 MG/ML
3 INJECTION INTRAMUSCULAR; INTRAVENOUS; SUBCUTANEOUS EVERY 8 HOURS
Refills: 0 | Status: DISCONTINUED | OUTPATIENT
Start: 2021-07-27 | End: 2021-07-27

## 2021-07-27 NOTE — ASU PATIENT PROFILE, ADULT - PMH
Anxiety    Bipolar 1 disorder    Bunion, right foot    Depression    Hypercholesteremia    Hypothyroidism

## 2021-07-27 NOTE — ASU DISCHARGE PLAN (ADULT/PEDIATRIC) - CARE PROVIDER_API CALL
Regan Hale  PODIATRIC MEDICINE AND SURGERY  [address] [facility], [facility] [place] [address]  Phone: [phone]  Fax: [phone]  Follow Up Time:

## 2021-07-30 LAB — SURGICAL PATHOLOGY STUDY: SIGNIFICANT CHANGE UP

## 2021-11-01 NOTE — ASU PATIENT PROFILE, ADULT - ATTEMPT TO OOB
HPI:  57 yo female PMH PVD, smoker, HTN, HLD, h/o right pontine CVA (2/7/21), intellectual disability, hearing/speech impairment presents with chronic left heel ulcer and pain. Pt's son was at bedside to help translate. Pt states the pain is in the entire left leg and can travel up to her back. Pt recently visited the ED pm 10/25/21 for left lower extremity chronic arterial occlusion. Duplex at that time showed No significant arterial blood flow visualized beyond the left superficial femoral artery. Pt was sent to see Dr. Sullivan as an outpatient; at that appointment the pt was told to return to the hospital for an angiogram. Denies fever, chills, CP, SOB, abdominal, HA.       
no

## 2022-01-26 ENCOUNTER — TRANSCRIPTION ENCOUNTER (OUTPATIENT)
Age: 42
End: 2022-01-26

## 2022-02-06 ENCOUNTER — TRANSCRIPTION ENCOUNTER (OUTPATIENT)
Age: 42
End: 2022-02-06

## 2022-04-01 NOTE — ASU DISCHARGE PLAN (ADULT/PEDIATRIC). - MEDICATION SUMMARY - MEDICATIONS TO TAKE
02-Apr-2022
I will START or STAY ON the medications listed below when I get home from the hospital:    Naprosyn 500 mg oral tablet  -- 1 tab(s) by mouth 2 times a day  -- Check with your doctor before becoming pregnant.  May cause drowsiness or dizziness.  Obtain medical advice before taking any non-prescription drugs as some may affect the action of this medication.  Take with food or milk.    -- Indication: For pain    Percocet 5/325 oral tablet  -- 1 tab(s) by mouth 2 times a day MDD:max 3 tab  -- Caution federal law prohibits the transfer of this drug to any person other  than the person for whom it was prescribed.  May cause drowsiness.  Alcohol may intensify this effect.  Use care when operating dangerous machinery.  This prescription cannot be refilled.  This product contains acetaminophen.  Do not use  with any other product containing acetaminophen to prevent possible liver damage.  Using more of this medication than prescribed may cause serious breathing problems.    -- Indication: For pain    LaMICtal  -- 125 milligram(s) by mouth once a day  -- Indication: For per pcp    Adderall XR 10 mg oral capsule, extended release  -- 1 cap(s) by mouth twice  -- Indication: For per pcp

## 2022-10-29 NOTE — ASU PATIENT PROFILE, ADULT - BLOOD TRANSFUSION, PREVIOUS, PROFILE

## 2023-04-18 NOTE — ASU PATIENT PROFILE, ADULT - BILL OF RIGHTS/ADMISSION INFORMATION PROVIDED TO:
OFFICE VISIT      Patient: Lalo Escalera Date of Service: 4/10/2023   : 1969 MRN: [id number]     SUBJECTIVE:     Chief Complaint   Patient presents with   • STI Screening   • Rash     Pt  c/o having itchy rash for 2 months         HISTORY OF PRESENT ILLNESS:  Lalo Escalera is a 54 year old male who presents today for follow up on PREP and sti screen      PAST MEDICAL HISTORY:  Past Medical History:   Diagnosis Date   • No known problems        ACTIVE PROBLEM LIST:  There is no problem list on file for this patient.      MEDICATIONS:  Current Outpatient Medications   Medication Sig   • doxycycline hyclate (VIBRAMYCIN) 100 MG capsule Take 1 capsule by mouth daily.   • ketoconazole (NIZORAL) 2 % cream Apply topically 2 times daily. Anti fungal   • triamcinolone (ARISTOCORT) 0.1 % cream Apply topically 2 times daily. Steroid for itching   • Descovy 200-25 MG per tablet TAKE 1 TABLET BY MOUTH DAILY   • ciprofloxacin (Cipro) 500 MG tablet Take 1 tablet by mouth every 12 hours.   • TEMazepam (RESTORIL) 15 MG capsule Take 1 capsule by mouth nightly as needed for Sleep.   • fluocinolone (SYNALAR) 0.01 % topical solution Apply topically 2 times daily.   • clindamycin-benzoyl peroxide (BENZACLIN) 1-5 % gel Apply topically 2 times daily.   • minocycline (MINOCIN) 50 MG capsule Take 1 capsule by mouth 2 times daily.     No current facility-administered medications for this visit.       ALLERGIES:  ALLERGIES:  No Known Allergies    PAST SURGICAL HISTORY:  Past Surgical History:   Procedure Laterality Date   • Colonoscopy     • Tonsillectomy         FAMILY HISTORY:  Family History   Problem Relation Age of Onset   • Diabetes Mother    • Cancer Mother    • Hyperlipidemia Mother    • Hypertension Mother        SOCIAL HISTORY:  Social History     Tobacco Use   • Smoking status: Never   • Smokeless tobacco: Never   Vaping Use   • Vaping Use: never used   Substance Use Topics   • Alcohol use: Yes   • Drug use: Not  Currently       Review of Systems      OBJECTIVE:     Visit Vitals  /80 (BP Location: LUE - Left upper extremity, Patient Position: Sitting, Cuff Size: Large Adult)   Pulse 74   Temp 98.3 °F (36.8 °C) (Temporal)   Ht 5' 11\" (1.803 m)   Wt 79 kg (174 lb 2.6 oz)   SpO2 97%   BMI 24.29 kg/m²       Physical Exam  Vitals reviewed.   Constitutional:       General: He is not in acute distress.     Appearance: Normal appearance. He is normal weight.   Skin:     General: Skin is warm and dry.      Comments: Minor atopic changes of skin   Neurological:      General: No focal deficit present.      Mental Status: He is alert and oriented to person, place, and time.      Cranial Nerves: No cranial nerve deficit.   Psychiatric:         Mood and Affect: Mood normal.         Behavior: Behavior normal.         Thought Content: Thought content normal.           DIAGNOSTIC STUDIES:   LAB RESULTS:    Lab Results Reviewed and   Office Visit on 04/10/2023   Component Date Value Ref Range Status   • Chlamydia trachomatis by Nucleic A* 04/10/2023 Negative  Negative Final   • Neisseria gonorrhoeae by Nucleic A* 04/10/2023 Negative  Negative Final   • Disclaimer 04/10/2023    Final                    Value:This result contains rich text formatting which cannot be displayed here.   • Chlamydia trachomatis by Nucleic A* 04/10/2023 Negative  Negative Final   • Neisseria gonorrhoeae by Nucleic A* 04/10/2023 Negative  Negative Final   • Disclaimer 04/10/2023    Final                    Value:This result contains rich text formatting which cannot be displayed here.   • HIV Antigen/ Antibody Combo Screen 04/10/2023 Nonreactive  Nonreactive Final   • RPR 04/10/2023 Nonreactive  Nonreactive Final   • Sodium 04/10/2023 136  135 - 145 mmol/L Final   • Potassium 04/10/2023 3.9  3.4 - 5.1 mmol/L Final   • Chloride 04/10/2023 107  97 - 110 mmol/L Final   • Carbon Dioxide 04/10/2023 25  21 - 32 mmol/L Final   • Anion Gap 04/10/2023 8  7 - 19 mmol/L  Final   • Glucose 04/10/2023 91  70 - 99 mg/dL Final   • BUN 04/10/2023 13  6 - 20 mg/dL Final   • Creatinine 04/10/2023 0.77  0.67 - 1.17 mg/dL Final   • Glomerular Filtration Rate 04/10/2023 >90  >=60 Final   • BUN/Cr 04/10/2023 17  7 - 25 Final   • Calcium 04/10/2023 8.9  8.4 - 10.2 mg/dL Final      Results for orders placed or performed in visit on 04/10/23   Chlamydia/Gonorrhea by Nucleic Acid Amplification   Result Value Ref Range    Chlamydia trachomatis by Nucleic Acid Amplification Negative Negative    Neisseria gonorrhoeae by Nucleic Acid Amplification Negative Negative    Disclaimer       The expected normal reference range is negative. Positive results are reported to the State Department of Public Health.     The Aptima Combo 2 Assay is not intended for the evaluation of suspected sexual abuse or for other medico-legal indications, nor has it been evaluated in adolescents less than 14 years of age. In these scenarios, and in clinical settings where the prevalence of infection is low, confirmatory testing on positive results is recommended.     Chlamydia/Gonorrhea by Nucleic Acid Amplification   Result Value Ref Range    Chlamydia trachomatis by Nucleic Acid Amplification Negative Negative    Neisseria gonorrhoeae by Nucleic Acid Amplification Negative Negative    Disclaimer       The expected normal reference range is negative. Positive results are reported to the State Department of Public Health.     The Aptima Combo 2 Assay is not intended for the evaluation of suspected sexual abuse or for other medico-legal indications, nor has it been evaluated in adolescents less than 14 years of age. In these scenarios, and in clinical settings where the prevalence of infection is low, confirmatory testing on positive results is recommended.     HIV 1/HIV 2 Antigen/Antibody Screen   Result Value Ref Range    HIV Antigen/ Antibody Combo Screen Nonreactive Nonreactive   RPR (Rapid Plasma Reagin) Traditional  Syphilis Screen Algorithm   Result Value Ref Range    RPR Nonreactive Nonreactive   Basic Metabolic Panel   Result Value Ref Range    Fasting Status      Sodium 136 135 - 145 mmol/L    Potassium 3.9 3.4 - 5.1 mmol/L    Chloride 107 97 - 110 mmol/L    Carbon Dioxide 25 21 - 32 mmol/L    Anion Gap 8 7 - 19 mmol/L    Glucose 91 70 - 99 mg/dL    BUN 13 6 - 20 mg/dL    Creatinine 0.77 0.67 - 1.17 mg/dL    Glomerular Filtration Rate >90 >=60    BUN/Cr 17 7 - 25    Calcium 8.9 8.4 - 10.2 mg/dL           Assessment AND PLAN:   This is a 54 year old year-old male who presents with     1. High risk sexual behavior, unspecified type    2. Routine screening for STI (sexually transmitted infection)        Assessment   Problem List Items Addressed This Visit    None  Visit Diagnoses     High risk sexual behavior, unspecified type    -  Primary    Relevant Orders    Basic Metabolic Panel (Completed)    Routine screening for STI (sexually transmitted infection)        Relevant Orders    RPR (Rapid Plasma Reagin) Traditional Syphilis Screen Algorithm (Completed)    HIV 1/HIV 2 Antigen/Antibody Screen (Completed)    Chlamydia/Gonorrhea by Nucleic Acid Amplification (Completed)    Chlamydia/Gonorrhea by Nucleic Acid Amplification (Completed)      All STI screens are negative      Return in about 4 months (around 8/10/2023).    Instructions provided as documented in the AVS.        Medical compliance with plan discussed and risks of non-compliance reviewed.    Patient education completed on disease process, etiology & prognosis.    Patient expresses understanding of the plan.    Proper usage and side effects of medications reviewed & discussed.    Refer to orders.    Return to clinic as clinically indicated as discussed with patient who verbalized understanding of & agreement with the plan.       The patient indicated understanding of the diagnosis and agreed with the plan of care.         Patient

## 2023-05-04 NOTE — H&P PST ADULT - VENOUS THROMBOEMBOLISM CURRENT STATUS
[Follow-Up: _____] : a [unfilled] follow-up visit
major surgery, including arthroscopic and laparoscopic (greater than 1 hr)

## 2023-06-14 ENCOUNTER — NON-APPOINTMENT (OUTPATIENT)
Age: 43
End: 2023-06-14

## 2023-09-05 ENCOUNTER — NON-APPOINTMENT (OUTPATIENT)
Age: 43
End: 2023-09-05

## 2024-01-06 ENCOUNTER — NON-APPOINTMENT (OUTPATIENT)
Age: 44
End: 2024-01-06

## 2024-11-26 NOTE — ASU PATIENT PROFILE, ADULT - HAS THE PATIENT USED TOBACCO IN THE PAST 30 DAYS?
No Detail Level: Detailed Introduction Text (Please End With A Colon): The following procedure was deferred: pt is planning to have more kids in the future. Instructions (Optional): Discussed option of using laser treatment. Pt is planning on having more kids and will revisit this after she is done. Discussed taking collagen supplements as well as mederma cream to lighten stretch marks Size Of Lesion In Cm (Optional): 0

## 2024-12-03 NOTE — ASU PATIENT PROFILE, ADULT - TEACHING/LEARNING CULTURAL CONSIDERATIONS
1.Medication Refill Request   Name of Medication ALPRAZolam (XANAX) 1 mg tablet  Dose/Frequency Take 1 tablet (1 mg total) by mouth 4 (four) times a day as needed for anxiety  Quantity 120  Verified pharmacy   [x]  Verified ordering Provider   [x]  Does patient have enough for the next 3 days? Yes [x] No []  Does patient have a follow-up appointment scheduled? Yes [x] No []  If so when is appointment: 12/10/24    2.Medication Refill Request   Name of Medication amphetamine-dextroamphetamine (ADDERALL, 20MG,) 20 mg tablet  Dose/Frequency Take 1 tablet (20 mg total) by mouth 2 (two) times a day Max Daily Amount: 40 mg  Quantity 60  Verified pharmacy   [x]  Verified ordering Provider   [x]  Does patient have enough for the next 3 days? Yes [x] No []  Does patient have a follow-up appointment scheduled? Yes [x] No []  If so when is appointment: 12/10/24    3.Medication Refill Request   Name of Medication lamoTRIgine (LaMICtal) 200 MG tablet  Dose/Frequency Take 1 tablet (200 mg total) by mouth daily at bedtime  Quantity 30  Verified pharmacy   [x]  Verified ordering Provider   [x]  Does patient have enough for the next 3 days? Yes [x] No []  Does patient have a follow-up appointment scheduled? Yes [x] No []  If so when is appointment: 12/10/24    4.Medication Refill Request   Name of Medication lamoTRIgine (LaMICtal) 200 MG tablet  Dose/Frequency Take 1 tablet (200 mg total) by mouth daily at bedtime  Quantity 30  Verified pharmacy   [x]  Verified ordering Provider   [x]  Does patient have enough for the next 3 days? Yes [x] No []  Does patient have a follow-up appointment scheduled? Yes [x] No []  If so when is appointment: 10/12/24   none